# Patient Record
Sex: FEMALE | Race: OTHER | NOT HISPANIC OR LATINO | URBAN - METROPOLITAN AREA
[De-identification: names, ages, dates, MRNs, and addresses within clinical notes are randomized per-mention and may not be internally consistent; named-entity substitution may affect disease eponyms.]

---

## 2020-08-03 ENCOUNTER — EMERGENCY (EMERGENCY)
Facility: HOSPITAL | Age: 60
LOS: 1 days | Discharge: HOME | End: 2020-08-03
Attending: EMERGENCY MEDICINE
Payer: COMMERCIAL

## 2020-08-03 VITALS
OXYGEN SATURATION: 98 % | TEMPERATURE: 98 F | DIASTOLIC BLOOD PRESSURE: 86 MMHG | WEIGHT: 115.08 LBS | SYSTOLIC BLOOD PRESSURE: 132 MMHG | RESPIRATION RATE: 20 BRPM | HEIGHT: 64 IN | HEART RATE: 92 BPM

## 2020-08-03 LAB
ALBUMIN SERPL ELPH-MCNC: 5 G/DL — SIGNIFICANT CHANGE UP (ref 3.3–5.2)
ALP SERPL-CCNC: 73 U/L — SIGNIFICANT CHANGE UP (ref 40–120)
ALT FLD-CCNC: 21 U/L — SIGNIFICANT CHANGE UP
ANION GAP SERPL CALC-SCNC: 11 MMOL/L — SIGNIFICANT CHANGE UP (ref 5–17)
APPEARANCE UR: CLEAR — SIGNIFICANT CHANGE UP
APTT BLD: 30.1 SEC — SIGNIFICANT CHANGE UP (ref 27.5–35.5)
AST SERPL-CCNC: 34 U/L — HIGH
BACTERIA # UR AUTO: NEGATIVE — SIGNIFICANT CHANGE UP
BASOPHILS # BLD AUTO: 0.03 K/UL — SIGNIFICANT CHANGE UP (ref 0–0.2)
BASOPHILS NFR BLD AUTO: 0.4 % — SIGNIFICANT CHANGE UP (ref 0–2)
BILIRUB SERPL-MCNC: 0.4 MG/DL — SIGNIFICANT CHANGE UP (ref 0.4–2)
BILIRUB UR-MCNC: NEGATIVE — SIGNIFICANT CHANGE UP
BUN SERPL-MCNC: 12 MG/DL — SIGNIFICANT CHANGE UP (ref 8–20)
CALCIUM SERPL-MCNC: 9.6 MG/DL — SIGNIFICANT CHANGE UP (ref 8.6–10.2)
CHLORIDE SERPL-SCNC: 100 MMOL/L — SIGNIFICANT CHANGE UP (ref 98–107)
CO2 SERPL-SCNC: 25 MMOL/L — SIGNIFICANT CHANGE UP (ref 22–29)
COLOR SPEC: YELLOW — SIGNIFICANT CHANGE UP
CREAT SERPL-MCNC: 0.78 MG/DL — SIGNIFICANT CHANGE UP (ref 0.5–1.3)
DIFF PNL FLD: ABNORMAL
EOSINOPHIL # BLD AUTO: 0.01 K/UL — SIGNIFICANT CHANGE UP (ref 0–0.5)
EOSINOPHIL NFR BLD AUTO: 0.1 % — SIGNIFICANT CHANGE UP (ref 0–6)
EPI CELLS # UR: SIGNIFICANT CHANGE UP
GLUCOSE SERPL-MCNC: 110 MG/DL — HIGH (ref 70–99)
GLUCOSE UR QL: NEGATIVE MG/DL — SIGNIFICANT CHANGE UP
HCT VFR BLD CALC: 42.1 % — SIGNIFICANT CHANGE UP (ref 34.5–45)
HGB BLD-MCNC: 13.8 G/DL — SIGNIFICANT CHANGE UP (ref 11.5–15.5)
IMM GRANULOCYTES NFR BLD AUTO: 0.1 % — SIGNIFICANT CHANGE UP (ref 0–1.5)
INR BLD: 1.06 RATIO — SIGNIFICANT CHANGE UP (ref 0.88–1.16)
KETONES UR-MCNC: NEGATIVE — SIGNIFICANT CHANGE UP
LEUKOCYTE ESTERASE UR-ACNC: NEGATIVE — SIGNIFICANT CHANGE UP
LYMPHOCYTES # BLD AUTO: 1.51 K/UL — SIGNIFICANT CHANGE UP (ref 1–3.3)
LYMPHOCYTES # BLD AUTO: 21.9 % — SIGNIFICANT CHANGE UP (ref 13–44)
MCHC RBC-ENTMCNC: 31 PG — SIGNIFICANT CHANGE UP (ref 27–34)
MCHC RBC-ENTMCNC: 32.8 GM/DL — SIGNIFICANT CHANGE UP (ref 32–36)
MCV RBC AUTO: 94.6 FL — SIGNIFICANT CHANGE UP (ref 80–100)
MONOCYTES # BLD AUTO: 0.44 K/UL — SIGNIFICANT CHANGE UP (ref 0–0.9)
MONOCYTES NFR BLD AUTO: 6.4 % — SIGNIFICANT CHANGE UP (ref 2–14)
NEUTROPHILS # BLD AUTO: 4.91 K/UL — SIGNIFICANT CHANGE UP (ref 1.8–7.4)
NEUTROPHILS NFR BLD AUTO: 71.1 % — SIGNIFICANT CHANGE UP (ref 43–77)
NITRITE UR-MCNC: NEGATIVE — SIGNIFICANT CHANGE UP
PH UR: 7 — SIGNIFICANT CHANGE UP (ref 5–8)
PLATELET # BLD AUTO: 270 K/UL — SIGNIFICANT CHANGE UP (ref 150–400)
POTASSIUM SERPL-MCNC: 4.2 MMOL/L — SIGNIFICANT CHANGE UP (ref 3.5–5.3)
POTASSIUM SERPL-SCNC: 4.2 MMOL/L — SIGNIFICANT CHANGE UP (ref 3.5–5.3)
PROT SERPL-MCNC: 7.5 G/DL — SIGNIFICANT CHANGE UP (ref 6.6–8.7)
PROT UR-MCNC: NEGATIVE MG/DL — SIGNIFICANT CHANGE UP
PROTHROM AB SERPL-ACNC: 12.3 SEC — SIGNIFICANT CHANGE UP (ref 10.6–13.6)
RBC # BLD: 4.45 M/UL — SIGNIFICANT CHANGE UP (ref 3.8–5.2)
RBC # FLD: 12.7 % — SIGNIFICANT CHANGE UP (ref 10.3–14.5)
RBC CASTS # UR COMP ASSIST: SIGNIFICANT CHANGE UP /HPF (ref 0–4)
SODIUM SERPL-SCNC: 136 MMOL/L — SIGNIFICANT CHANGE UP (ref 135–145)
SP GR SPEC: 1.01 — SIGNIFICANT CHANGE UP (ref 1.01–1.02)
TROPONIN T SERPL-MCNC: <0.01 NG/ML — SIGNIFICANT CHANGE UP (ref 0–0.06)
UROBILINOGEN FLD QL: NEGATIVE MG/DL — SIGNIFICANT CHANGE UP
WBC # BLD: 6.91 K/UL — SIGNIFICANT CHANGE UP (ref 3.8–10.5)
WBC # FLD AUTO: 6.91 K/UL — SIGNIFICANT CHANGE UP (ref 3.8–10.5)
WBC UR QL: SIGNIFICANT CHANGE UP

## 2020-08-03 PROCEDURE — 70496 CT ANGIOGRAPHY HEAD: CPT | Mod: 26

## 2020-08-03 PROCEDURE — 93010 ELECTROCARDIOGRAM REPORT: CPT

## 2020-08-03 PROCEDURE — 99218: CPT

## 2020-08-03 PROCEDURE — 70551 MRI BRAIN STEM W/O DYE: CPT | Mod: 26

## 2020-08-03 PROCEDURE — 71045 X-RAY EXAM CHEST 1 VIEW: CPT | Mod: 26

## 2020-08-03 PROCEDURE — 70498 CT ANGIOGRAPHY NECK: CPT | Mod: 26

## 2020-08-03 NOTE — ED ADULT NURSE NOTE - OBJECTIVE STATEMENT
Pt received A&Ox3 c/o numbness in R foot beginning last Wednesday. Pt states the numbness spread to her R side of face. Pt states she feels that she is dragging her foot whenever she walks. Pt states she has pain in the back of her neck 5/10. Pt feels a burning sensation underneath R eye. Pt denies any HA, fever, chills, dizziness, blurred vision, SOB, chest pain, NVD, dysuria. MAEx4 w/purpose. Breathing even and unlabored. Pt resting comfortably in stretcher w/call bell in reach. Pt safety maintained. VSS. NAD. Pt made aware of plan of care.

## 2020-08-03 NOTE — ED PROVIDER NOTE - CRANIAL NERVE AND PUPILLARY EXAM
tongue is midline/central and peripheral vision intact/extra-ocular movements intact/decreased sensation to right side of face, V1-3 distrubution/CRANIAL NERVES NOT INTACT

## 2020-08-03 NOTE — ED PROVIDER NOTE - PROGRESS NOTE DETAILS
labs and imaging reviewed, no acute findings, but symptoms still warrant further investigation, will place in obs for MRI; d/w obs team

## 2020-08-03 NOTE — ED ADULT NURSE REASSESSMENT NOTE - NS ED NURSE REASSESS COMMENT FT1
Pt received from ED RN YONATHAN CAVAZOS&LEON4. No acute distress noted. Pt denies chest pain,sob and dizziness. Complains of a slight headache; patient states it is because she is hungry. Pt received from ED RN YONATHAN CAVAZOS&OX4. No acute distress noted. Pt denies chest pain, sob and dizziness. Complains of a slight headache; patient states it is because she is hungry. POC reviewed. No further questions by patient. NPO education provided. Patent 20 RAC; CDI. MRI head pending. Will continue to monitor. Call bell within reach. Safety maintained.

## 2020-08-03 NOTE — ED CDU PROVIDER INITIAL DAY NOTE - ATTENDING CONTRIBUTION TO CARE
Shadi: I performed a face to face bedside interview with patient regarding history of present illness, review of symptoms and past medical history. I completed an independent physical exam.  I have discussed patient's plan of care with advanced care provider.   I agree with note as stated above including HISTORY OF PRESENT ILLNESS, HIV, PAST MEDICAL/SURGICAL/FAMILY/SOCIAL HISTORY, ALLERGIES AND HOME MEDICATIONS, REVIEW OF SYSTEMS, PHYSICAL EXAM, MEDICAL DECISION MAKING and any PROGRESS NOTES during the time I functioned as the attending physician for this patient  unless otherwise noted. My brief assessment is as follows: 59F no PMH p/w R sided numbness/tingling x 5 days, also feels like her R foot is slapping when she is walking. CTH, CTA head/neck negative in ED. Has diminished R sided sensation on exam. CDU plan for MR head and reassess.

## 2020-08-03 NOTE — ED PROVIDER NOTE - OBJECTIVE STATEMENT
58 yo female denies PMHx p/w right sided numbness and diff walking since last wednesday, approx 5 days; patient states she started experience right posterior headache last wednesday, that improved on its own, but was assoc with tingling to right side of face, which then progressed to numbness of right shin and foot; states top of her foot is mostly numb/tingly, but has noticed over last 48 hrs now progressed to diff walking; feels like she's slapping her right foot. No current headache.

## 2020-08-03 NOTE — ED ADULT NURSE REASSESSMENT NOTE - NS ED NURSE REASSESS COMMENT FT1
Assumed pt care at this time. Pt resting comfortably in stretcher, A&Ox3, NAD noted, respirations even and nonlabored. Pt denies complaints at this time, stating her rt foot is feeling slightly better.

## 2020-08-03 NOTE — ED CDU PROVIDER INITIAL DAY NOTE - CRANIAL NERVE AND PUPILLARY EXAM
decreased sensation to right side of face, V1-3 distrubution/central and peripheral vision intact/extra-ocular movements intact/tongue is midline/CRANIAL NERVES NOT INTACT

## 2020-08-03 NOTE — ED ADULT NURSE NOTE - NSIMPLEMENTINTERV_GEN_ALL_ED
Implemented All Universal Safety Interventions:  [place] to call system. Call bell, personal items and telephone within reach. Instruct patient to call for assistance. Room bathroom lighting operational. Non-slip footwear when patient is off stretcher. Physically safe environment: no spills, clutter or unnecessary equipment. Stretcher in lowest position, wheels locked, appropriate side rails in place. Implemented All Fall Risk Interventions:  [place] to call system. Call bell, personal items and telephone within reach. Instruct patient to call for assistance. Room bathroom lighting operational. Non-slip footwear when patient is off stretcher. Physically safe environment: no spills, clutter or unnecessary equipment. Stretcher in lowest position, wheels locked, appropriate side rails in place. Provide visual cue, wrist band, yellow gown, etc. Monitor gait and stability. Monitor for mental status changes and reorient to person, place, and time. Review medications for side effects contributing to fall risk. Reinforce activity limits and safety measures with patient and family.

## 2020-08-04 VITALS
HEART RATE: 76 BPM | TEMPERATURE: 98 F | RESPIRATION RATE: 19 BRPM | DIASTOLIC BLOOD PRESSURE: 84 MMHG | OXYGEN SATURATION: 99 % | SYSTOLIC BLOOD PRESSURE: 119 MMHG

## 2020-08-04 PROCEDURE — 70551 MRI BRAIN STEM W/O DYE: CPT

## 2020-08-04 PROCEDURE — 81001 URINALYSIS AUTO W/SCOPE: CPT

## 2020-08-04 PROCEDURE — 36415 COLL VENOUS BLD VENIPUNCTURE: CPT

## 2020-08-04 PROCEDURE — 99217: CPT

## 2020-08-04 PROCEDURE — 70496 CT ANGIOGRAPHY HEAD: CPT

## 2020-08-04 PROCEDURE — 70498 CT ANGIOGRAPHY NECK: CPT

## 2020-08-04 PROCEDURE — 70450 CT HEAD/BRAIN W/O DYE: CPT

## 2020-08-04 PROCEDURE — 99223 1ST HOSP IP/OBS HIGH 75: CPT

## 2020-08-04 PROCEDURE — 71045 X-RAY EXAM CHEST 1 VIEW: CPT

## 2020-08-04 PROCEDURE — 85610 PROTHROMBIN TIME: CPT

## 2020-08-04 PROCEDURE — 85730 THROMBOPLASTIN TIME PARTIAL: CPT

## 2020-08-04 PROCEDURE — 99284 EMERGENCY DEPT VISIT MOD MDM: CPT | Mod: 25

## 2020-08-04 PROCEDURE — 93005 ELECTROCARDIOGRAM TRACING: CPT

## 2020-08-04 PROCEDURE — 85027 COMPLETE CBC AUTOMATED: CPT

## 2020-08-04 PROCEDURE — 80053 COMPREHEN METABOLIC PANEL: CPT

## 2020-08-04 PROCEDURE — G0378: CPT

## 2020-08-04 PROCEDURE — 84484 ASSAY OF TROPONIN QUANT: CPT

## 2020-08-04 RX ADMIN — Medication 60 MILLIGRAM(S): at 09:26

## 2020-08-04 NOTE — CONSULT NOTE ADULT - ASSESSMENT
The patient is a 59y Female with difficulty with numbness and weakness of the right foot.     Right foot weakness/numbness  Examination findings suggest Peroneal nerve palsy.   MRI negative for stroke.   Will need physical therapy.  If no improvement over next 2-4 weeks will need EMG/NCV as outpatient.     Migraine  At present only occurs once per month and responds to over the counter medication.   No further intervention needed.   If symptoms increase in frequency to the point where they occur more than twice per week we can consider prophylaxis.     Case discussed with Dr Fleming (ER attending).

## 2020-08-04 NOTE — ED CDU PROVIDER SUBSEQUENT DAY NOTE - ATTENDING CONTRIBUTION TO CARE
seen with acp on rounds  pt with some pain to back of neck then r side of face and also had numbness to r foot. no trauma  thus far work up negative re: stroke. Pt able to ambulate but has some difficulty with I believe foot strike. She is in no distress and is currently awaiting a neurologic evaluation. Agree with care plan

## 2020-08-04 NOTE — ED CDU PROVIDER DISPOSITION NOTE - CLINICAL COURSE
pt is a 58 y/o female presenting to the ed with right sided numbness and diff walking since last wednesday, pt experiencing headache last wednesday that improved on its own was associated with tingling to right side of face that progressed to numbness of right shin and foot, progressing to diff walking over the last 48 hours, cta head/neck negative, MRI negative, pt reports improvement of all sxs: cn ii-xii intact, neurovasculary intact, muscle strength 5/5 x 4 extremities, gait without ataxia, f/u with neurology outpatiently

## 2020-08-04 NOTE — ED CDU PROVIDER SUBSEQUENT DAY NOTE - CRANIAL NERVE AND PUPILLARY EXAM
decreased sensation to right side of face, V1-3 distrubution/CRANIAL NERVES NOT INTACT/central and peripheral vision intact/extra-ocular movements intact/tongue is midline

## 2020-08-04 NOTE — CONSULT NOTE ADULT - SUBJECTIVE AND OBJECTIVE BOX
[facility]                                        [facility] [place]                                  [facility]                                      [address]                                           [place], [place], [address]                                                [phone]        CC: Right foot numbness.     HISTORY:  The patient is a 59y Female who presented with numbness and weakness of the right foot.   This began 6 days prior to arrival. She reports she had some neck pain at the time.   She was in the shower and began to feel some tingling in the foot that extended up to the calf.   This persisted and the next day she noted some difficulty walking. She felt as if her foot was slapping the floor.   She noted some minimal numbness in the right hand and on her face which prompted her to come to the ER.   At present the only complaint is in the right foot.   She has persisting numbness up to the mid calf.     PAST MEDICAL & SURGICAL HISTORY:  She has a history of migraine for many years. This occurs about once per month and responds to over the counter analgesics.   No significant past surgical history    MEDICATION PRIOR TO ADMISSION:  None.     Allergies  No Known Allergies    SOCIAL HISTORY:  Non smoker.     FAMILY HISTORY:  Mother: No known history of stroke.   Father: No known history of stroke.   Sibling: No known history of stroke.   Son alive and well.     ROS:  Constitutional: The patient denies fevers or weight changes.  Neuro: As per HPI.  Eyes: Denies blurry vision.  Ears/nose/throat: Denies Tinnitus.   Cardiac: Denies chest pain. Denies palpitations.  Respiratory: Denies shortness of breath.  GI: Denies abdominal pain, nausea, or vomiting.  : Denies change in urinary pattern.  Integumentary: Denies rash.  Psych: Denies recent mood changes.  Heme: denies easy bleeding/bruising.    Exam:  Vital Signs Last 24 Hrs  T(C): 36.6 (04 Aug 2020 07:38), Max: 36.8 (03 Aug 2020 14:04)  T(F): 97.8 (04 Aug 2020 07:38), Max: 98.2 (03 Aug 2020 14:04)  HR: 64 (04 Aug 2020 07:38) (55 - 102)  BP: 125/79 (04 Aug 2020 07:38) (102/61 - 132/86)  RR: 18 (04 Aug 2020 07:38) (18 - 20)  SpO2: 99% (04 Aug 2020 07:38) (95% - 99%)  General: NAD.   Carotid bruits absent.     Mental status: The patient is awake, alert, and fully oriented. There is no aphasia. Attention span is normal. Patient is aware of current events.     Cranial nerves: There is no papilledema (direct ophthalmoscope). Pupils react symmetrically to light. There is no visual field deficit to confrontation. Extraocular motion is full with no nystagmus. There is no ptosis. Facial sensation is intact. Facial musculature is symmetric. Palate elevates symmetrically. Tongue is midline.    Motor: There is normal bulk and tone.  Strength is 5/5 in the right arm and leg proximally.   Strength is 5-/5 in foot dorsiflexion, plantar flexion, inversion and eversion.   Strength is 5/5 in the left arm and leg.    Sensation: decreased pin in the right foot.     Reflexes: 2+ throughout and plantar responses are flexor.    Cerebellar: There is no dysmetria on finger to nose testing.    LABS:                         13.8   6.91  )-----------( 270      ( 03 Aug 2020 17:03 )             42.1       08-03    136  |  100  |  12.0  ----------------------------<  110<H>  4.2   |  25.0  |  0.78    Ca    9.6      03 Aug 2020 17:03    TPro  7.5  /  Alb  5.0  /  TBili  0.4  /  DBili  x   /  AST  34<H>  /  ALT  21  /  AlkPhos  73  08-03      PT/INR - ( 03 Aug 2020 17:03 )   PT: 12.3 sec;   INR: 1.06 ratio    PTT - ( 03 Aug 2020 17:03 )  PTT:30.1 sec    RADIOLOGY   MRI brain images reviewed (and concur with report): There is no acute pathology.

## 2020-08-04 NOTE — ED CDU PROVIDER DISPOSITION NOTE - CARE PROVIDER_API CALL
Daniel Cuba  NEUROLOGY  [address] [place] [address]  Phone: [phone]  Fax: [phone]  Follow Up Time:
Mann Florence  CLINICAL NEUROPHYSIOLOGY  [address] [place] [id number]  Phone: [phone]  Fax: [phone]  Follow Up Time:

## 2020-08-04 NOTE — ED CDU PROVIDER DISPOSITION NOTE - NSFOLLOWUPINSTRUCTIONS_ED_ALL_ED_FT
Please follow up with neurology within four days. Return to ER if worsening of symptoms, weakness in leg, unable to lift foot up, color or temperature changes in leg, headache, dizziness, passing out, chest pain, shortness of breath or any new concerns

## 2020-08-04 NOTE — ED CDU PROVIDER DISPOSITION NOTE - CARE PROVIDERS DIRECT ADDRESSES
,DirectAddress_Unknown
[email or web address]

## 2020-08-04 NOTE — ED CDU PROVIDER DISPOSITION NOTE - PATIENT PORTAL LINK FT
You can access the FollowMyHealth Patient Portal offered by [facility] by registering at the following website: [email or web address]. By joining [employer]’s FollowMyHealth portal, you will also be able to view your health information using other applications (apps) compatible with our system.
You can access the FollowMyHealth Patient Portal offered by [facility] by registering at the following website: [email or web address]. By joining [employer]’s FollowMyHealth portal, you will also be able to view your health information using other applications (apps) compatible with our system.

## 2020-08-04 NOTE — ED CDU PROVIDER DISPOSITION NOTE - CLINICAL COURSE
58 y/o female placed in observation for RLE numbness and headache. CT / MR results noted and discussed. Evaluated by neuro who recommends outpatient PT for likely peroneal nerve palsy. ED return precautions discussed. Case management made aware to assist with PT. Will d/c

## 2020-08-04 NOTE — ED ADULT NURSE REASSESSMENT NOTE - NS ED NURSE REASSESS COMMENT FT1
Pt sleeping comfortably in bed . No acute distress noted. NSR on the monitor. Alarms reviewed with monitor tech. Breathing equal and unlabored. Will continue to monitor. Call bell within reach

## 2020-08-07 PROBLEM — Z00.00 ENCOUNTER FOR PREVENTIVE HEALTH EXAMINATION: Status: ACTIVE | Noted: 2020-08-07
